# Patient Record
(demographics unavailable — no encounter records)

---

## 2025-01-30 NOTE — HISTORY OF PRESENT ILLNESS
[FreeTextEntry1] : pt presents for med follow up  [de-identified] : 66M is here for follow up. No acute medical concerns.

## 2025-01-30 NOTE — PHYSICAL EXAM
[No Respiratory Distress] : no respiratory distress  [No Accessory Muscle Use] : no accessory muscle use [Clear to Auscultation] : lungs were clear to auscultation bilaterally [Normal Rate] : normal rate  [Regular Rhythm] : with a regular rhythm [Normal S1, S2] : normal S1 and S2 [No Murmur] : no murmur heard [Pedal Pulses Present] : the pedal pulses are present [No Extremity Clubbing/Cyanosis] : no extremity clubbing/cyanosis [Normal Gait] : normal gait [Alert and Oriented x3] : oriented to person, place, and time

## 2025-01-30 NOTE — PLAN
[FreeTextEntry1] : will check routine labs  PHQ-9 neg  continue all medications as directed  I-STOP:  #: [id number] RTO as routine for follow up.

## 2025-04-21 NOTE — PLAN
[FreeTextEntry1] : refer to derm for finger cyst  start Augmentin as directed  advised hand soaks   reviewed prior labs  start ppi due to GERD  continue all medications as directed  RTO as routine for follow up.

## 2025-07-10 NOTE — HISTORY OF PRESENT ILLNESS
[FreeTextEntry1] : patient presents for 3 month follow up for HLD and anxiety. patient would like a refill on his Clonazepam; patient would also like to discuss about getting a referral to a Dermatologist for a bump on his right index finger.  [de-identified] : 67M is here for follow up. He has a recurrent cyst on his finger along with muscle pains from statin. He admits to anxiety as well with job.

## 2025-07-10 NOTE — HISTORY OF PRESENT ILLNESS
[FreeTextEntry1] : patient presents for 3 month follow up for HLD and anxiety. patient would like a refill on his Clonazepam; patient would also like to discuss about getting a referral to a Dermatologist for a bump on his right index finger.  [de-identified] : 67M is here for follow up. He has a recurrent cyst on his finger along with muscle pains from statin. He admits to anxiety as well with job.

## 2025-07-10 NOTE — PHYSICAL EXAM
[No Respiratory Distress] : no respiratory distress  [No Accessory Muscle Use] : no accessory muscle use [Clear to Auscultation] : lungs were clear to auscultation bilaterally [Normal Rate] : normal rate  [Regular Rhythm] : with a regular rhythm [Normal S1, S2] : normal S1 and S2 [No Murmur] : no murmur heard [No Extremity Clubbing/Cyanosis] : no extremity clubbing/cyanosis [Normal Gait] : normal gait [Alert and Oriented x3] : oriented to person, place, and time [de-identified] : left finger cyst

## 2025-07-10 NOTE — PHYSICAL EXAM
[No Respiratory Distress] : no respiratory distress  [No Accessory Muscle Use] : no accessory muscle use [Clear to Auscultation] : lungs were clear to auscultation bilaterally [Normal Rate] : normal rate  [Regular Rhythm] : with a regular rhythm [Normal S1, S2] : normal S1 and S2 [No Murmur] : no murmur heard [No Extremity Clubbing/Cyanosis] : no extremity clubbing/cyanosis [Normal Gait] : normal gait [Alert and Oriented x3] : oriented to person, place, and time [de-identified] : left finger cyst

## 2025-07-10 NOTE — PLAN
[FreeTextEntry1] : HLD will stop Atorvastatin 40 mg for Pravastatin 40 mg PO QD  pt is intolerant of statin due to muscle cramping  will check routine labs  I-STOP: Clonazepam refilled